# Patient Record
Sex: MALE | Race: BLACK OR AFRICAN AMERICAN | URBAN - METROPOLITAN AREA
[De-identification: names, ages, dates, MRNs, and addresses within clinical notes are randomized per-mention and may not be internally consistent; named-entity substitution may affect disease eponyms.]

---

## 2020-06-22 ENCOUNTER — TELEPHONE ENCOUNTER (OUTPATIENT)
Dept: URBAN - METROPOLITAN AREA CLINIC 92 | Facility: CLINIC | Age: 55
End: 2020-06-22

## 2020-07-09 ENCOUNTER — OFFICE VISIT (OUTPATIENT)
Dept: URBAN - METROPOLITAN AREA CLINIC 74 | Facility: CLINIC | Age: 55
End: 2020-07-09

## 2020-07-30 ENCOUNTER — LAB OUTSIDE AN ENCOUNTER (OUTPATIENT)
Dept: URBAN - METROPOLITAN AREA CLINIC 74 | Facility: CLINIC | Age: 55
End: 2020-07-30

## 2020-07-30 ENCOUNTER — WEB ENCOUNTER (OUTPATIENT)
Dept: URBAN - METROPOLITAN AREA CLINIC 74 | Facility: CLINIC | Age: 55
End: 2020-07-30

## 2020-07-30 ENCOUNTER — OFFICE VISIT (OUTPATIENT)
Dept: URBAN - METROPOLITAN AREA CLINIC 74 | Facility: CLINIC | Age: 55
End: 2020-07-30
Payer: COMMERCIAL

## 2020-07-30 DIAGNOSIS — R19.5 HEME POSITIVE STOOL: ICD-10-CM

## 2020-07-30 DIAGNOSIS — D50.0 IRON DEFICIENCY ANEMIA SECONDARY TO BLOOD LOSS (CHRONIC): ICD-10-CM

## 2020-07-30 DIAGNOSIS — Z86.010 PERSONAL HISTORY OF COLONIC POLYPS: ICD-10-CM

## 2020-07-30 DIAGNOSIS — K57.30 COLON, DIVERTICULOSIS: ICD-10-CM

## 2020-07-30 DIAGNOSIS — E66.3 OVERWEIGHT (BMI 25.0-29.9): ICD-10-CM

## 2020-07-30 DIAGNOSIS — K29.30 CHRONIC SUPERFICIAL GASTRITIS WITHOUT BLEEDING: ICD-10-CM

## 2020-07-30 PROCEDURE — 99213 OFFICE O/P EST LOW 20 MIN: CPT | Performed by: INTERNAL MEDICINE

## 2020-07-30 PROCEDURE — G8427 DOCREV CUR MEDS BY ELIG CLIN: HCPCS | Performed by: INTERNAL MEDICINE

## 2020-07-30 PROCEDURE — 3017F COLORECTAL CA SCREEN DOC REV: CPT | Performed by: INTERNAL MEDICINE

## 2020-07-30 PROCEDURE — G8419 CALC BMI OUT NRM PARAM NOF/U: HCPCS | Performed by: INTERNAL MEDICINE

## 2020-07-30 PROCEDURE — 1036F TOBACCO NON-USER: CPT | Performed by: INTERNAL MEDICINE

## 2020-07-30 RX ORDER — SODIUM, POTASSIUM,MAG SULFATES 17.5-3.13G
345 ML SOLUTION, RECONSTITUTED, ORAL ORAL
Qty: 1 | Refills: 0 | OUTPATIENT

## 2020-07-30 NOTE — HPI-TODAY'S VISIT:
Today July 30, 2020 the patient returns for a follow-up visit, the patient was last seen in the office February 27, 2020, at the time the patient was being followed for iron deficient anemia due to chronic blood loss, Hemoccult positive stools, history of chronic gastritis and personal history of colon polyps as well as diverticulosis coli. The patient had a colonoscopy in December 2018 that showed a sigmoid adenomatous polyp and diverticulosis coli.  The patient had an EGD in January 2020 which showed normal esophageal mucosa, mild H. pylori negative chronic gastritis and normal duodenal mucosa.  During the February 27, 2020 visit the patient denied having any rectal bleeding or melena, labs showed a normal iron, TIBC percent saturation and ferritin but the Hemosure was positive.  The patient was recommended to have a capsule endoscopy which apparently was never done. Laboratory reported on February 27, 2020 showed an H&H of 11.4 and 37.9. The patient currently denies any acid reflux, heartburn, dysphagia, odynophagia, nausea or vomiting, hematemesis, melena, hematochezia.  The patient denied any abdominal pain, unexplained weight loss.  The patient is due to have lab work with primary care, we will obtain reports for review. The patient was unable to get a capsule endoscopy, insurance will not approve the capsule unless the colonoscopy is repeated and is within 1 year of the date of the capsule. The patient will be scheduled to have a surveillance colonoscopy, benefits potential complications and alternatives to colonoscopy were disclosed.

## 2020-08-17 ENCOUNTER — OFFICE VISIT (OUTPATIENT)
Dept: URBAN - METROPOLITAN AREA SURGERY CENTER 30 | Facility: SURGERY CENTER | Age: 55
End: 2020-08-17
Payer: COMMERCIAL

## 2020-08-17 DIAGNOSIS — D50.0 ANEMIA DUE TO BLOOD LOSS: ICD-10-CM

## 2020-08-17 DIAGNOSIS — R19.5 ABNORMAL FECES: ICD-10-CM

## 2020-08-17 PROCEDURE — 45378 DIAGNOSTIC COLONOSCOPY: CPT | Performed by: INTERNAL MEDICINE

## 2020-08-17 PROCEDURE — G8907 PT DOC NO EVENTS ON DISCHARG: HCPCS | Performed by: INTERNAL MEDICINE

## 2020-08-17 PROCEDURE — G9937 DIG OR SURV COLSCO: HCPCS | Performed by: INTERNAL MEDICINE

## 2020-09-03 ENCOUNTER — LAB OUTSIDE AN ENCOUNTER (OUTPATIENT)
Dept: URBAN - METROPOLITAN AREA CLINIC 74 | Facility: CLINIC | Age: 55
End: 2020-09-03

## 2020-09-03 ENCOUNTER — OFFICE VISIT (OUTPATIENT)
Dept: URBAN - METROPOLITAN AREA CLINIC 74 | Facility: CLINIC | Age: 55
End: 2020-09-03
Payer: COMMERCIAL

## 2020-09-03 DIAGNOSIS — R19.5 HEME POSITIVE STOOL: ICD-10-CM

## 2020-09-03 DIAGNOSIS — Z86.010 PERSONAL HISTORY OF COLONIC POLYPS: ICD-10-CM

## 2020-09-03 DIAGNOSIS — K29.30 CHRONIC SUPERFICIAL GASTRITIS WITHOUT BLEEDING: ICD-10-CM

## 2020-09-03 DIAGNOSIS — K57.30 COLON, DIVERTICULOSIS: ICD-10-CM

## 2020-09-03 DIAGNOSIS — D50.0 IRON DEFICIENCY ANEMIA SECONDARY TO BLOOD LOSS (CHRONIC): ICD-10-CM

## 2020-09-03 DIAGNOSIS — E66.3 OVERWEIGHT (BMI 25.0-29.9): ICD-10-CM

## 2020-09-03 PROCEDURE — 99213 OFFICE O/P EST LOW 20 MIN: CPT | Performed by: INTERNAL MEDICINE

## 2020-09-03 PROCEDURE — 1036F TOBACCO NON-USER: CPT | Performed by: INTERNAL MEDICINE

## 2020-09-03 PROCEDURE — 3017F COLORECTAL CA SCREEN DOC REV: CPT | Performed by: INTERNAL MEDICINE

## 2020-09-03 PROCEDURE — G8427 DOCREV CUR MEDS BY ELIG CLIN: HCPCS | Performed by: INTERNAL MEDICINE

## 2020-09-03 PROCEDURE — G8419 CALC BMI OUT NRM PARAM NOF/U: HCPCS | Performed by: INTERNAL MEDICINE

## 2020-09-03 RX ORDER — SODIUM, POTASSIUM,MAG SULFATES 17.5-3.13G
345 ML SOLUTION, RECONSTITUTED, ORAL ORAL
Qty: 1 | Refills: 0 | Status: DISCONTINUED | COMMUNITY

## 2020-09-03 NOTE — HPI-TODAY'S VISIT:
Today July 30, 2020 the patient returns for a follow-up visit, the patient was last seen in the office February 27, 2020, at the time the patient was being followed for iron deficient anemia due to chronic blood loss, Hemoccult positive stools, history of chronic gastritis and personal history of colon polyps as well as diverticulosis coli. The patient had a colonoscopy in December 2018 that showed a sigmoid adenomatous polyp and diverticulosis coli.  The patient had an EGD in January 2020 which showed normal esophageal mucosa, mild H. pylori negative chronic gastritis and normal duodenal mucosa.  During the February 27, 2020 visit the patient denied having any rectal bleeding or melena, labs showed a normal iron, TIBC percent saturation and ferritin but the Hemosure was positive.  The patient was recommended to have a capsule endoscopy which apparently was never done. Laboratory reported on February 27, 2020 showed an H&H of 11.4 and 37.9. The patient currently denies any acid reflux, heartburn, dysphagia, odynophagia, nausea or vomiting, hematemesis, melena, hematochezia.  The patient denied any abdominal pain, unexplained weight loss.  The patient is due to have lab work with primary care, we will obtain reports for review. The patient was unable to get a capsule endoscopy, insurance will not approve the capsule unless the colonoscopy is repeated and is within 1 year of the date of the capsule. The patient will be scheduled to have a surveillance colonoscopy, benefits potential complications and alternatives to colonoscopy were disclosed. Today September 3, 2020 the patient returns for a follow-up visit, the patient was last seen in the office July 30, 2020 with a history of iron deficient anemia secondary to chronic blood loss, personal history of colonic polyps, heme positive stools, colonic diverticulosis, chronic superficial gastritis without bleeding, the patient was overweight. During the visit patient denied any evidence of upper or lower GI bleeding, patient also denied any upper or lower GI symptoms.  There was no weight loss. The patient was to be scheduled for a capsule endoscopy which could not be performed, the insurance would not perform the procedure unless the patient had a repeat colonoscopy for which the patient was scheduled. The colonoscopy was performed August 17, 2020 and it showed normal colonic mucosa, normal distal terminal ileum, nonbleeding internal hemorrhoids.  The patient was advised to have a surveillance colonoscopy in 5 years. Today the patient returns stating that he is doing well, he denies any upper or lower GI symptoms, there has been no evidence of any bleeding. Lab performed in early August 2020 revealed slight improvement on his H&H to 811.9 and 37.7.  The CMP was normal, the patient had a negative COVID-19 antigen. The patient will be scheduled to have a small bowel capsule endoscopy for occult GI bleeding and anemia. The patient will return for a follow-up visit in 6 weeks.

## 2020-09-17 ENCOUNTER — CLAIMS CREATED FROM THE CLAIM WINDOW (OUTPATIENT)
Dept: URBAN - METROPOLITAN AREA CLINIC 73 | Facility: CLINIC | Age: 55
End: 2020-09-17

## 2020-09-17 ENCOUNTER — CLAIMS CREATED FROM THE CLAIM WINDOW (OUTPATIENT)
Dept: URBAN - METROPOLITAN AREA CLINIC 73 | Facility: CLINIC | Age: 55
End: 2020-09-17
Payer: COMMERCIAL

## 2020-09-17 ENCOUNTER — OFFICE VISIT (OUTPATIENT)
Dept: URBAN - METROPOLITAN AREA CLINIC 73 | Facility: CLINIC | Age: 55
End: 2020-09-17

## 2020-09-17 DIAGNOSIS — D50.0 ANEMIA DUE TO BLOOD LOSS: ICD-10-CM

## 2020-09-17 PROCEDURE — 91110 GI TRC IMG INTRAL ESOPH-ILE: CPT | Performed by: INTERNAL MEDICINE

## 2020-09-18 ENCOUNTER — TELEPHONE ENCOUNTER (OUTPATIENT)
Dept: URBAN - METROPOLITAN AREA CLINIC 92 | Facility: CLINIC | Age: 55
End: 2020-09-18

## 2020-09-23 ENCOUNTER — TELEPHONE ENCOUNTER (OUTPATIENT)
Dept: URBAN - METROPOLITAN AREA CLINIC 40 | Facility: CLINIC | Age: 55
End: 2020-09-23

## 2020-10-06 PROBLEM — 162863004: Status: ACTIVE | Noted: 2020-07-30

## 2020-10-06 PROBLEM — 72007001: Status: ACTIVE | Noted: 2020-10-06

## 2020-10-07 ENCOUNTER — OFFICE VISIT (OUTPATIENT)
Dept: URBAN - METROPOLITAN AREA CLINIC 74 | Facility: CLINIC | Age: 55
End: 2020-10-07
Payer: COMMERCIAL

## 2020-10-07 ENCOUNTER — LAB OUTSIDE AN ENCOUNTER (OUTPATIENT)
Dept: URBAN - METROPOLITAN AREA CLINIC 74 | Facility: CLINIC | Age: 55
End: 2020-10-07

## 2020-10-07 ENCOUNTER — DASHBOARD ENCOUNTERS (OUTPATIENT)
Age: 55
End: 2020-10-07

## 2020-10-07 DIAGNOSIS — K29.30 CHRONIC SUPERFICIAL GASTRITIS WITHOUT BLEEDING: ICD-10-CM

## 2020-10-07 DIAGNOSIS — K29.80 DUODENITIS: ICD-10-CM

## 2020-10-07 DIAGNOSIS — K55.20 ANGIODYSPLASIA OF SMALL INTESTINE: ICD-10-CM

## 2020-10-07 DIAGNOSIS — R19.5 HEME POSITIVE STOOL: ICD-10-CM

## 2020-10-07 DIAGNOSIS — K57.30 COLON, DIVERTICULOSIS: ICD-10-CM

## 2020-10-07 DIAGNOSIS — Z86.010 PERSONAL HISTORY OF COLONIC POLYPS: ICD-10-CM

## 2020-10-07 DIAGNOSIS — D50.0 IRON DEFICIENCY ANEMIA SECONDARY TO BLOOD LOSS (CHRONIC): ICD-10-CM

## 2020-10-07 DIAGNOSIS — E66.3 OVERWEIGHT (BMI 25.0-29.9): ICD-10-CM

## 2020-10-07 PROCEDURE — G8427 DOCREV CUR MEDS BY ELIG CLIN: HCPCS | Performed by: INTERNAL MEDICINE

## 2020-10-07 PROCEDURE — G8482 FLU IMMUNIZE ORDER/ADMIN: HCPCS | Performed by: INTERNAL MEDICINE

## 2020-10-07 PROCEDURE — 3017F COLORECTAL CA SCREEN DOC REV: CPT | Performed by: INTERNAL MEDICINE

## 2020-10-07 PROCEDURE — G8419 CALC BMI OUT NRM PARAM NOF/U: HCPCS | Performed by: INTERNAL MEDICINE

## 2020-10-07 PROCEDURE — 1036F TOBACCO NON-USER: CPT | Performed by: INTERNAL MEDICINE

## 2020-10-07 PROCEDURE — 99213 OFFICE O/P EST LOW 20 MIN: CPT | Performed by: INTERNAL MEDICINE

## 2020-10-07 NOTE — HPI-TODAY'S VISIT:
Today July 30, 2020 the patient returns for a follow-up visit, the patient was last seen in the office February 27, 2020, at the time the patient was being followed for iron deficient anemia due to chronic blood loss, Hemoccult positive stools, history of chronic gastritis and personal history of colon polyps as well as diverticulosis coli. The patient had a colonoscopy in December 2018 that showed a sigmoid adenomatous polyp and diverticulosis coli.  The patient had an EGD in January 2020 which showed normal esophageal mucosa, mild H. pylori negative chronic gastritis and normal duodenal mucosa.  During the February 27, 2020 visit the patient denied having any rectal bleeding or melena, labs showed a normal iron, TIBC percent saturation and ferritin but the Hemosure was positive.  The patient was recommended to have a capsule endoscopy which apparently was never done. Laboratory reported on February 27, 2020 showed an H&H of 11.4 and 37.9. The patient currently denies any acid reflux, heartburn, dysphagia, odynophagia, nausea or vomiting, hematemesis, melena, hematochezia.  The patient denied any abdominal pain, unexplained weight loss.  The patient is due to have lab work with primary care, we will obtain reports for review. The patient was unable to get a capsule endoscopy, insurance will not approve the capsule unless the colonoscopy is repeated and is within 1 year of the date of the capsule. The patient will be scheduled to have a surveillance colonoscopy, benefits potential complications and alternatives to colonoscopy were disclosed. Today September 3, 2020 the patient returns for a follow-up visit, the patient was last seen in the office July 30, 2020 with a history of iron deficient anemia secondary to chronic blood loss, personal history of colonic polyps, heme positive stools, colonic diverticulosis, chronic superficial gastritis without bleeding, the patient was overweight. During the visit patient denied any evidence of upper or lower GI bleeding, patient also denied any upper or lower GI symptoms.  There was no weight loss. The patient was to be scheduled for a capsule endoscopy which could not be performed, the insurance would not perform the procedure unless the patient had a repeat colonoscopy for which the patient was scheduled. The colonoscopy was performed August 17, 2020 and it showed normal colonic mucosa, normal distal terminal ileum, nonbleeding internal hemorrhoids.  The patient was advised to have a surveillance colonoscopy in 5 years. Today the patient returns stating that he is doing well, he denies any upper or lower GI symptoms, there has been no evidence of any bleeding. Lab performed in early August 2020 revealed slight improvement on his H&H to 811.9 and 37.7.  The CMP was normal, the patient had a negative COVID-19 antigen. The patient will be scheduled to have a small bowel capsule endoscopy for occult GI bleeding and anemia. The patient will return for a follow-up visit in 6 weeks. Today October 7, 2020 the patient returns for a follow-up visit, the patient was last seen in the office on September 3, 2020, the patient was followed for iron deficient anemia secondary to chronic blood loss, heme positive stools, personal history of colonic polyps, colon diverticulosis, chronic superficial gastritis without bleeding, the patient was overweight. At the time of the visit the patient appeared to be doing well, he denied any upper or lower GI symptoms, there was no evidence of any active bleeding.  The colonoscopy performed August 17, 2020 showed normal colonic mucosa, normal distal terminal ileum, nonbleeding internal hemorrhoids.  The EGD performed on January 2020 showed normal esophageal mucosa, mild H. pylori negative chronic gastritis and normal duodenal mucosa. Laboratory performed in early August 2020 revealed a slight improvement of his H&H to 11.9 and 37.7, the CMP was normal the patient had negative COVID 19 antigen.  The patient was scheduled to have a small bowel capsule endoscopy for occult GI bleeding and anemia. The capsule endoscopy was performed on September 17, 2020 and it showed a few areas of mild denuded mucosa in the duodenum, a few very small angiectasia were noted in the mid small bowel but they did not appear to be bleeding or the source of anemia. Today the patient states that he is doing well, there has been no evidence of any bleeding, we discussed at length push enteroscopy and ablation, the patient decided that he would rather follow his CBC and stools for occult blood, the patient will return in 8 weeks with a CBC and a Hemosure.

## 2020-12-02 ENCOUNTER — OFFICE VISIT (OUTPATIENT)
Dept: URBAN - METROPOLITAN AREA CLINIC 74 | Facility: CLINIC | Age: 55
End: 2020-12-02

## 2025-06-23 ENCOUNTER — LAB OUTSIDE AN ENCOUNTER (OUTPATIENT)
Dept: URBAN - METROPOLITAN AREA CLINIC 74 | Facility: CLINIC | Age: 60
End: 2025-06-23

## 2025-06-23 ENCOUNTER — OFFICE VISIT (OUTPATIENT)
Dept: URBAN - METROPOLITAN AREA CLINIC 74 | Facility: CLINIC | Age: 60
End: 2025-06-23
Payer: COMMERCIAL

## 2025-06-23 DIAGNOSIS — Z86.0101 HISTORY OF ADENOMATOUS POLYP OF COLON: ICD-10-CM

## 2025-06-23 PROCEDURE — 99203 OFFICE O/P NEW LOW 30 MIN: CPT | Performed by: PHYSICIAN ASSISTANT

## 2025-06-23 NOTE — HPI-TODAY'S VISIT:
The patient is 60-year-old male with past medical history as noted below known to Dr. Block is presenting to our clinic today to schedule surveillance colonoscopy. No GI issues today. No family history of colon cancer but family history of colon polyps.   Procedures: -- Capsule endoscopy on 09/17/2020 by Dr. Muhammad noted a few areas of mild denuded mucosa noted in the duodenum.  A few very small angiectasia noted in the mid small bowel, but unlikely to be the etiology of anemia.  Otherwise overall negative PillCam.  -- Colonoscopy on 08/17/2020 by Dr. Block noted normal mucosa in the entire examined colon.  The examined portion of the ileum was normal.  Non-bleeding internal hemorrhoids.  The examination was otherwise normal.  No specimen collected.  Repeat colonoscopy in 5 years for surveillance.  -- EGD with biopsy on 01/21/2020 by Dr. Block noted normal examined duodenum.  Erythematous mucosa in the antrum and prepyloric region of the stomach.  Normal esophagus.  Biopsy of duodenum with no significant histopathology no celiac's sprue.  Stomach with mild chronic inactive gastritis.  No H.pylori organism or intestinal metaplasia.